# Patient Record
Sex: FEMALE | Race: BLACK OR AFRICAN AMERICAN | Employment: OTHER | URBAN - METROPOLITAN AREA
[De-identification: names, ages, dates, MRNs, and addresses within clinical notes are randomized per-mention and may not be internally consistent; named-entity substitution may affect disease eponyms.]

---

## 2017-11-07 ENCOUNTER — HOSPITAL ENCOUNTER (EMERGENCY)
Facility: HOSPITAL | Age: 22
Discharge: HOME | End: 2017-11-08
Attending: EMERGENCY MEDICINE
Payer: MEDICAID

## 2017-11-07 ENCOUNTER — APPOINTMENT (OUTPATIENT)
Dept: GENERAL RADIOLOGY | Facility: HOSPITAL | Age: 22
End: 2017-11-07
Attending: EMERGENCY MEDICINE
Payer: MEDICAID

## 2017-11-07 DIAGNOSIS — S69.91XA INJURY OF RIGHT THUMB, INITIAL ENCOUNTER: Primary | ICD-10-CM

## 2017-11-07 PROCEDURE — 73140 X-RAY EXAM OF FINGER(S): CPT | Performed by: EMERGENCY MEDICINE

## 2017-11-07 PROCEDURE — 99283 EMERGENCY DEPT VISIT LOW MDM: CPT

## 2017-11-07 RX ORDER — MELOXICAM 7.5 MG/1
7.5 TABLET ORAL DAILY
Qty: 14 TABLET | Refills: 0 | Status: SHIPPED | OUTPATIENT
Start: 2017-11-07 | End: 2017-11-21

## 2017-11-07 RX ORDER — IBUPROFEN 600 MG/1
600 TABLET ORAL ONCE
Status: COMPLETED | OUTPATIENT
Start: 2017-11-07 | End: 2017-11-07

## 2017-11-08 VITALS
DIASTOLIC BLOOD PRESSURE: 70 MMHG | SYSTOLIC BLOOD PRESSURE: 122 MMHG | RESPIRATION RATE: 16 BRPM | OXYGEN SATURATION: 99 % | WEIGHT: 135 LBS | BODY MASS INDEX: 22.49 KG/M2 | HEIGHT: 65 IN | HEART RATE: 68 BPM | TEMPERATURE: 99 F

## 2017-11-08 NOTE — ED INITIAL ASSESSMENT (HPI)
Pt seen here in the beginning of the year, dx with torn ligament--never followed up. Pt tonight slammed right hand in a door, now having right hand pain in the same place.

## 2017-11-08 NOTE — ED PROVIDER NOTES
Patient Seen in: [facility] AND [facility] Emergency Department    History   Patient presents with:  Upper Extremity Injury (musculoskeletal)    Stated Complaint: Right hand pain     HPI    24 yo F without PMH aside from prior right thumb injury presenting with ri skin.  Psychiatric: Cooperative. Nursing note and vitals reviewed. ED Course   Labs Reviewed - No data to display  RIGHT THUMB X-RAY 2348 HRS. IMPRESSION:  No evidence of acute fracture or dislocation.      Evidence of dislocation of the proxim extremity tenderness. Radiography pending. 0019: Radiography nonacute, velcro thumb spica applied without complication. DC home with symptomatic care and hand followup.     Disposition and Plan     Clinical Impression:  Injury of right thumb, initial enc

## 2018-09-02 ENCOUNTER — HOSPITAL (OUTPATIENT)
Dept: OTHER | Age: 23
End: 2018-09-02
Attending: EMERGENCY MEDICINE

## (undated) NOTE — LETTER
November 8, 2017    Patient: Romina Garcia   Date of Visit: 11/7/2017       To Whom It May Concern:    Jaky Chirinos was seen and treated in our emergency department on 11/7/2017.  She can return to work with these limitations: no heavy lifting or exc

## (undated) NOTE — ED AVS SNAPSHOT
Ashanti Evelyn   MRN: [id number]    Department:  [facility] Emergency Department   Date of Visit:  11/7/2017           Disclosure     Insurance plans vary and the physician(s) referred by the ER may not be covered by your plan.  Please contact CARE PHYSICIAN AT ONCE OR RETURN IMMEDIATELY TO THE EMERGENCY DEPARTMENT. If you have been prescribed any medication(s), please fill your prescription right away and begin taking the medication(s) as directed.   If you believe that any of the medications